# Patient Record
Sex: MALE | URBAN - NONMETROPOLITAN AREA
[De-identification: names, ages, dates, MRNs, and addresses within clinical notes are randomized per-mention and may not be internally consistent; named-entity substitution may affect disease eponyms.]

---

## 2017-12-22 ENCOUNTER — DOCTOR'S OFFICE (OUTPATIENT)
Dept: URBAN - NONMETROPOLITAN AREA CLINIC 1 | Facility: CLINIC | Age: 77
Setting detail: OPHTHALMOLOGY
End: 2017-12-22
Payer: COMMERCIAL

## 2017-12-22 DIAGNOSIS — H18.51: ICD-10-CM

## 2017-12-22 DIAGNOSIS — Z96.1: ICD-10-CM

## 2017-12-22 DIAGNOSIS — H40.003: ICD-10-CM

## 2017-12-22 DIAGNOSIS — H26.493: ICD-10-CM

## 2017-12-22 DIAGNOSIS — H35.363: ICD-10-CM

## 2017-12-22 PROCEDURE — 92250 FUNDUS PHOTOGRAPHY W/I&R: CPT | Performed by: OPHTHALMOLOGY

## 2017-12-22 PROCEDURE — 92134 CPTRZ OPH DX IMG PST SGM RTA: CPT | Performed by: OPHTHALMOLOGY

## 2017-12-22 PROCEDURE — 92014 COMPRE OPH EXAM EST PT 1/>: CPT | Performed by: OPHTHALMOLOGY

## 2017-12-22 ASSESSMENT — REFRACTION_MANIFEST
OU_VA: 20/
OD_VA2: 20/
OD_VA3: 20/
OS_VA2: 20/
OD_VA2: 20/
OD_VA2: 20/
OS_VA2: 20/
OD_VA3: 20/
OS_VA3: 20/
OU_VA: 20/
OD_VA1: 20/
OS_VA2: 20/
OS_VA1: 20/
OD_VA1: 20/
OS_VA3: 20/
OD_VA1: 20/
OS_VA1: 20/
OU_VA: 20/
OS_VA1: 20/
OD_VA3: 20/
OS_VA3: 20/

## 2017-12-22 ASSESSMENT — REFRACTION_AUTOREFRACTION
OD_CYLINDER: -1.00
OS_CYLINDER: -1.00
OD_AXIS: 139
OS_SPHERE: +1.25
OD_SPHERE: +1.50
OS_AXIS: 57

## 2017-12-22 ASSESSMENT — CONFRONTATIONAL VISUAL FIELD TEST (CVF)
OD_FINDINGS: FULL
OS_FINDINGS: FULL

## 2017-12-22 ASSESSMENT — DRY EYES - PHYSICIAN NOTES
OS_GENERALCOMMENTS: MILD K SICCA
OD_GENERALCOMMENTS: MILD K SICCA

## 2017-12-22 ASSESSMENT — VISUAL ACUITY
OS_BCVA: 20/25-2
OD_BCVA: 20/25

## 2017-12-22 ASSESSMENT — REFRACTION_CURRENTRX
OD_OVR_VA: 20/
OS_OVR_VA: 20/
OS_OVR_VA: 20/
OD_OVR_VA: 20/
OD_OVR_VA: 20/
OS_OVR_VA: 20/

## 2017-12-22 ASSESSMENT — SPHEQUIV_DERIVED
OD_SPHEQUIV: 1
OS_SPHEQUIV: 0.75

## 2018-12-19 ENCOUNTER — RX ONLY (RX ONLY)
Age: 78
End: 2018-12-19

## 2018-12-19 ENCOUNTER — DOCTOR'S OFFICE (OUTPATIENT)
Dept: URBAN - NONMETROPOLITAN AREA CLINIC 1 | Facility: CLINIC | Age: 78
Setting detail: OPHTHALMOLOGY
End: 2018-12-19
Payer: COMMERCIAL

## 2018-12-19 DIAGNOSIS — H35.363: ICD-10-CM

## 2018-12-19 DIAGNOSIS — H26.493: ICD-10-CM

## 2018-12-19 DIAGNOSIS — H40.013: ICD-10-CM

## 2018-12-19 DIAGNOSIS — Z96.1: ICD-10-CM

## 2018-12-19 DIAGNOSIS — H18.51: ICD-10-CM

## 2018-12-19 PROCEDURE — 92286 ANT SGM IMG I&R SPECLR MIC: CPT | Performed by: OPHTHALMOLOGY

## 2018-12-19 PROCEDURE — 76514 ECHO EXAM OF EYE THICKNESS: CPT | Performed by: OPHTHALMOLOGY

## 2018-12-19 PROCEDURE — 92134 CPTRZ OPH DX IMG PST SGM RTA: CPT | Performed by: OPHTHALMOLOGY

## 2018-12-19 PROCEDURE — 92083 EXTENDED VISUAL FIELD XM: CPT | Performed by: OPHTHALMOLOGY

## 2018-12-19 PROCEDURE — 92014 COMPRE OPH EXAM EST PT 1/>: CPT | Performed by: OPHTHALMOLOGY

## 2018-12-19 ASSESSMENT — REFRACTION_MANIFEST
OS_VA2: 20/
OD_VA1: 20/
OU_VA: 20/
OS_VA1: 20/
OU_VA: 20/
OD_VA1: 20/
OD_VA2: 20/
OS_VA3: 20/
OD_VA2: 20/
OS_VA3: 20/
OD_VA3: 20/
OD_VA3: 20/
OS_VA1: 20/
OS_VA2: 20/

## 2018-12-19 ASSESSMENT — REFRACTION_CURRENTRX
OD_OVR_VA: 20/
OD_SPHERE: +0.75
OS_OVR_VA: 20/
OS_OVR_VA: 20/
OS_SPHERE: +0.75
OS_VPRISM_DIRECTION: PROGS
OD_ADD: +2.50
OS_AXIS: 126
OD_OVR_VA: 20/
OD_CYLINDER: -1.25
OS_ADD: +2.50
OD_AXIS: 131
OD_OVR_VA: 20/
OS_CYLINDER: -0.75
OS_OVR_VA: 20/
OD_VPRISM_DIRECTION: PROGS

## 2018-12-19 ASSESSMENT — SPHEQUIV_DERIVED
OS_SPHEQUIV: 0.125
OD_SPHEQUIV: 0.75

## 2018-12-19 ASSESSMENT — REFRACTION_AUTOREFRACTION
OD_SPHERE: +1.25
OD_AXIS: 117
OS_SPHERE: +0.50
OD_CYLINDER: -1.00
OS_CYLINDER: -0.75
OS_AXIS: 064

## 2018-12-19 ASSESSMENT — DRY EYES - PHYSICIAN NOTES
OD_GENERALCOMMENTS: MILD K SICCA
OS_GENERALCOMMENTS: MILD K SICCA

## 2018-12-19 ASSESSMENT — PACHYMETRY
OD_CT_UM: 576
OS_CT_UM: 565
OS_CT_CORRECTION: -1
OD_CT_CORRECTION: -2

## 2018-12-19 ASSESSMENT — VISUAL ACUITY
OD_BCVA: 20/25-1
OS_BCVA: 20/25

## 2018-12-19 ASSESSMENT — CONFRONTATIONAL VISUAL FIELD TEST (CVF)
OS_FINDINGS: FULL
OD_FINDINGS: FULL

## 2019-06-19 ENCOUNTER — DOCTOR'S OFFICE (OUTPATIENT)
Dept: URBAN - NONMETROPOLITAN AREA CLINIC 1 | Facility: CLINIC | Age: 79
Setting detail: OPHTHALMOLOGY
End: 2019-06-19
Payer: COMMERCIAL

## 2019-06-19 DIAGNOSIS — H18.51: ICD-10-CM

## 2019-06-19 DIAGNOSIS — H26.493: ICD-10-CM

## 2019-06-19 DIAGNOSIS — H40.013: ICD-10-CM

## 2019-06-19 DIAGNOSIS — Z96.1: ICD-10-CM

## 2019-06-19 DIAGNOSIS — H35.363: ICD-10-CM

## 2019-06-19 PROCEDURE — 92014 COMPRE OPH EXAM EST PT 1/>: CPT | Performed by: OPHTHALMOLOGY

## 2019-06-19 PROCEDURE — 92286 ANT SGM IMG I&R SPECLR MIC: CPT | Performed by: OPHTHALMOLOGY

## 2019-06-19 PROCEDURE — 92083 EXTENDED VISUAL FIELD XM: CPT | Performed by: OPHTHALMOLOGY

## 2019-06-19 ASSESSMENT — REFRACTION_MANIFEST
OD_VA1: 20/
OS_VA2: 20/
OD_VA2: 20/
OD_VA3: 20/
OD_VA3: 20/
OS_VA3: 20/
OS_VA2: 20/
OD_VA2: 20/
OD_VA1: 20/
OS_VA1: 20/
OU_VA: 20/
OS_VA1: 20/
OS_VA3: 20/
OU_VA: 20/

## 2019-06-19 ASSESSMENT — REFRACTION_AUTOREFRACTION
OD_SPHERE: +1.25
OS_CYLINDER: -0.75
OS_AXIS: 064
OD_CYLINDER: -1.00
OS_SPHERE: +0.50
OD_AXIS: 117

## 2019-06-19 ASSESSMENT — DRY EYES - PHYSICIAN NOTES
OD_GENERALCOMMENTS: MILD K SICCA
OS_GENERALCOMMENTS: MILD K SICCA

## 2019-06-19 ASSESSMENT — VISUAL ACUITY
OD_BCVA: 20/25
OS_BCVA: 20/25

## 2019-06-19 ASSESSMENT — REFRACTION_CURRENTRX
OD_OVR_VA: 20/
OS_OVR_VA: 20/
OD_VPRISM_DIRECTION: PROGS
OD_OVR_VA: 20/
OD_SPHERE: +0.75
OS_CYLINDER: -0.75
OS_SPHERE: +0.75
OS_ADD: +2.50
OS_VPRISM_DIRECTION: PROGS
OS_AXIS: 126
OD_AXIS: 131
OD_CYLINDER: -1.25
OD_OVR_VA: 20/
OD_ADD: +2.50

## 2019-06-19 ASSESSMENT — CONFRONTATIONAL VISUAL FIELD TEST (CVF)
OS_FINDINGS: FULL
OD_FINDINGS: FULL

## 2019-06-19 ASSESSMENT — SPHEQUIV_DERIVED
OD_SPHEQUIV: 0.75
OS_SPHEQUIV: 0.125

## 2019-12-18 ENCOUNTER — DOCTOR'S OFFICE (OUTPATIENT)
Dept: URBAN - NONMETROPOLITAN AREA CLINIC 1 | Facility: CLINIC | Age: 79
Setting detail: OPHTHALMOLOGY
End: 2019-12-18
Payer: COMMERCIAL

## 2019-12-18 DIAGNOSIS — H35.363: ICD-10-CM

## 2019-12-18 DIAGNOSIS — H18.51: ICD-10-CM

## 2019-12-18 DIAGNOSIS — H16.222: ICD-10-CM

## 2019-12-18 DIAGNOSIS — H40.013: ICD-10-CM

## 2019-12-18 DIAGNOSIS — H26.493: ICD-10-CM

## 2019-12-18 DIAGNOSIS — H16.221: ICD-10-CM

## 2019-12-18 PROCEDURE — 76514 ECHO EXAM OF EYE THICKNESS: CPT | Performed by: OPHTHALMOLOGY

## 2019-12-18 PROCEDURE — 83861 MICROFLUID ANALY TEARS: CPT | Performed by: OPHTHALMOLOGY

## 2019-12-18 PROCEDURE — 92133 CPTRZD OPH DX IMG PST SGM ON: CPT | Performed by: OPHTHALMOLOGY

## 2019-12-18 PROCEDURE — 92014 COMPRE OPH EXAM EST PT 1/>: CPT | Performed by: OPHTHALMOLOGY

## 2019-12-18 ASSESSMENT — REFRACTION_CURRENTRX
OD_CYLINDER: -1.25
OD_OVR_VA: 20/
OS_OVR_VA: 20/
OD_SPHERE: +0.75
OS_VPRISM_DIRECTION: PROGS
OS_SPHERE: +0.75
OS_OVR_VA: 20/
OD_VPRISM_DIRECTION: PROGS
OD_AXIS: 131
OS_CYLINDER: -0.75
OS_AXIS: 126
OD_ADD: +2.50
OD_OVR_VA: 20/
OS_ADD: +2.50
OD_OVR_VA: 20/
OS_OVR_VA: 20/

## 2019-12-18 ASSESSMENT — REFRACTION_AUTOREFRACTION
OD_AXIS: 117
OD_CYLINDER: -1.00
OS_AXIS: 064
OD_SPHERE: +1.25
OS_CYLINDER: -0.75
OS_SPHERE: +0.50

## 2019-12-18 ASSESSMENT — DRY EYES - PHYSICIAN NOTES
OD_GENERALCOMMENTS: MILD K SICCA
OS_GENERALCOMMENTS: MILD K SICCA

## 2019-12-18 ASSESSMENT — REFRACTION_MANIFEST
OS_VA2: 20/
OD_VA2: 20/
OS_VA3: 20/
OS_VA1: 20/
OD_VA1: 20/
OD_VA3: 20/
OU_VA: 20/
OU_VA: 20/
OD_VA2: 20/
OS_VA2: 20/
OS_VA1: 20/
OS_VA3: 20/
OD_VA1: 20/
OD_VA3: 20/

## 2019-12-18 ASSESSMENT — VISUAL ACUITY
OS_BCVA: 20/20-2
OD_BCVA: 20/20-2

## 2019-12-18 ASSESSMENT — PACHYMETRY
OS_CT_UM: 554
OD_CT_UM: 561
OD_CT_CORRECTION: -1
OS_CT_CORRECTION: -1

## 2019-12-18 ASSESSMENT — CORNEAL DYSTROPHY - POSTERIOR
OS_POSTERIORDYSTROPHY: GUTTATA
OD_POSTERIORDYSTROPHY: GUTTATA

## 2019-12-18 ASSESSMENT — CONFRONTATIONAL VISUAL FIELD TEST (CVF)
OS_FINDINGS: FULL
OD_FINDINGS: FULL

## 2019-12-18 ASSESSMENT — SPHEQUIV_DERIVED
OD_SPHEQUIV: 0.75
OS_SPHEQUIV: 0.125

## 2020-12-22 ENCOUNTER — DOCTOR'S OFFICE (OUTPATIENT)
Dept: URBAN - NONMETROPOLITAN AREA CLINIC 1 | Facility: CLINIC | Age: 80
Setting detail: OPHTHALMOLOGY
End: 2020-12-22
Payer: COMMERCIAL

## 2020-12-22 VITALS — HEIGHT: 60 IN

## 2020-12-22 DIAGNOSIS — H16.221: ICD-10-CM

## 2020-12-22 DIAGNOSIS — H40.013: ICD-10-CM

## 2020-12-22 DIAGNOSIS — H26.493: ICD-10-CM

## 2020-12-22 DIAGNOSIS — H16.222: ICD-10-CM

## 2020-12-22 DIAGNOSIS — H35.363: ICD-10-CM

## 2020-12-22 DIAGNOSIS — Z96.1: ICD-10-CM

## 2020-12-22 DIAGNOSIS — H18.40: ICD-10-CM

## 2020-12-22 PROCEDURE — 92083 EXTENDED VISUAL FIELD XM: CPT | Performed by: OPHTHALMOLOGY

## 2020-12-22 PROCEDURE — 83861 MICROFLUID ANALY TEARS: CPT | Performed by: OPHTHALMOLOGY

## 2020-12-22 PROCEDURE — 92133 CPTRZD OPH DX IMG PST SGM ON: CPT | Performed by: OPHTHALMOLOGY

## 2020-12-22 PROCEDURE — 92286 ANT SGM IMG I&R SPECLR MIC: CPT | Performed by: OPHTHALMOLOGY

## 2020-12-22 PROCEDURE — 92014 COMPRE OPH EXAM EST PT 1/>: CPT | Performed by: OPHTHALMOLOGY

## 2020-12-22 PROCEDURE — 76514 ECHO EXAM OF EYE THICKNESS: CPT | Performed by: OPHTHALMOLOGY

## 2020-12-22 ASSESSMENT — TONOMETRY
OD_IOP_MMHG: 12
OS_IOP_MMHG: 11

## 2020-12-22 ASSESSMENT — CORNEAL DYSTROPHY - POSTERIOR
OD_POSTERIORDYSTROPHY: GUTTATA
OS_POSTERIORDYSTROPHY: GUTTATA

## 2020-12-22 ASSESSMENT — PACHYMETRY
OS_CT_CORRECTION: 1
OD_CT_CORRECTION: 0
OD_CT_UM: 549
OS_CT_UM: 539

## 2020-12-22 ASSESSMENT — REFRACTION_AUTOREFRACTION
OD_SPHERE: +1.25
OS_AXIS: 064
OS_CYLINDER: -0.75
OD_CYLINDER: -1.00
OD_AXIS: 117
OS_SPHERE: +0.50

## 2020-12-22 ASSESSMENT — DRY EYES - PHYSICIAN NOTES
OS_GENERALCOMMENTS: MILD K SICCA
OD_GENERALCOMMENTS: MILD K SICCA

## 2020-12-22 ASSESSMENT — SPHEQUIV_DERIVED
OD_SPHEQUIV: 0.75
OS_SPHEQUIV: 0.125

## 2020-12-22 ASSESSMENT — REFRACTION_CURRENTRX
OS_OVR_VA: 20/
OS_AXIS: 126
OD_VPRISM_DIRECTION: PROGS
OD_CYLINDER: -1.25
OS_SPHERE: +0.75
OD_OVR_VA: 20/
OS_ADD: +2.50
OD_SPHERE: +0.75
OD_AXIS: 131
OS_VPRISM_DIRECTION: PROGS
OS_CYLINDER: -0.75
OD_ADD: +2.50

## 2020-12-22 ASSESSMENT — CONFRONTATIONAL VISUAL FIELD TEST (CVF)
OD_FINDINGS: FULL
OS_FINDINGS: FULL

## 2020-12-22 ASSESSMENT — VISUAL ACUITY
OS_BCVA: 20/25-2
OD_BCVA: 20/25-2

## 2022-05-03 ENCOUNTER — DOCTOR'S OFFICE (OUTPATIENT)
Dept: URBAN - NONMETROPOLITAN AREA CLINIC 1 | Facility: CLINIC | Age: 82
Setting detail: OPHTHALMOLOGY
End: 2022-05-03
Payer: COMMERCIAL

## 2022-05-03 DIAGNOSIS — Z96.1: ICD-10-CM

## 2022-05-03 DIAGNOSIS — H16.221: ICD-10-CM

## 2022-05-03 DIAGNOSIS — H26.493: ICD-10-CM

## 2022-05-03 DIAGNOSIS — H35.363: ICD-10-CM

## 2022-05-03 DIAGNOSIS — H16.222: ICD-10-CM

## 2022-05-03 DIAGNOSIS — H18.40: ICD-10-CM

## 2022-05-03 DIAGNOSIS — H40.013: ICD-10-CM

## 2022-05-03 PROCEDURE — 92083 EXTENDED VISUAL FIELD XM: CPT | Performed by: OPHTHALMOLOGY

## 2022-05-03 PROCEDURE — 76514 ECHO EXAM OF EYE THICKNESS: CPT | Performed by: OPHTHALMOLOGY

## 2022-05-03 PROCEDURE — 83861 MICROFLUID ANALY TEARS: CPT | Performed by: OPHTHALMOLOGY

## 2022-05-03 PROCEDURE — 92133 CPTRZD OPH DX IMG PST SGM ON: CPT | Performed by: OPHTHALMOLOGY

## 2022-05-03 PROCEDURE — 92286 ANT SGM IMG I&R SPECLR MIC: CPT | Performed by: OPHTHALMOLOGY

## 2022-05-03 PROCEDURE — 99214 OFFICE O/P EST MOD 30 MIN: CPT | Performed by: OPHTHALMOLOGY

## 2022-05-03 ASSESSMENT — PACHYMETRY
OS_CT_CORRECTION: -1
OS_CT_UM: 554
OD_CT_CORRECTION: -1
OD_CT_UM: 561

## 2022-05-03 ASSESSMENT — VISUAL ACUITY
OD_BCVA: 20/25-1
OS_BCVA: 20/25-1

## 2022-05-03 ASSESSMENT — CONFRONTATIONAL VISUAL FIELD TEST (CVF)
OS_FINDINGS: FULL
OD_FINDINGS: FULL

## 2022-05-03 ASSESSMENT — REFRACTION_CURRENTRX
OD_CYLINDER: -1.25
OD_VPRISM_DIRECTION: PROGS
OS_AXIS: 126
OD_OVR_VA: 20/
OS_CYLINDER: -0.75
OS_ADD: +2.50
OS_VPRISM_DIRECTION: PROGS
OD_AXIS: 131
OD_SPHERE: +0.75
OS_OVR_VA: 20/
OS_SPHERE: +0.75
OD_ADD: +2.50

## 2022-05-03 ASSESSMENT — SPHEQUIV_DERIVED
OS_SPHEQUIV: 0.125
OD_SPHEQUIV: 0.75

## 2022-05-03 ASSESSMENT — TONOMETRY
OS_IOP_MMHG: 14
OD_IOP_MMHG: 14

## 2022-05-03 ASSESSMENT — REFRACTION_AUTOREFRACTION
OS_CYLINDER: -0.75
OS_SPHERE: +0.50
OS_AXIS: 064
OD_SPHERE: +1.25
OD_CYLINDER: -1.00
OD_AXIS: 117

## 2022-05-03 ASSESSMENT — DRY EYES - PHYSICIAN NOTES
OD_GENERALCOMMENTS: MILD K SICCA
OS_GENERALCOMMENTS: MILD K SICCA

## 2022-05-03 ASSESSMENT — CORNEAL DYSTROPHY - POSTERIOR
OS_POSTERIORDYSTROPHY: GUTTATA
OD_POSTERIORDYSTROPHY: GUTTATA

## 2023-05-30 ENCOUNTER — DOCTOR'S OFFICE (OUTPATIENT)
Dept: URBAN - NONMETROPOLITAN AREA CLINIC 1 | Facility: CLINIC | Age: 83
Setting detail: OPHTHALMOLOGY
End: 2023-05-30
Payer: COMMERCIAL

## 2023-05-30 DIAGNOSIS — Z96.1: ICD-10-CM

## 2023-05-30 DIAGNOSIS — H16.221: ICD-10-CM

## 2023-05-30 DIAGNOSIS — H40.013: ICD-10-CM

## 2023-05-30 DIAGNOSIS — H16.222: ICD-10-CM

## 2023-05-30 DIAGNOSIS — H26.493: ICD-10-CM

## 2023-05-30 DIAGNOSIS — H35.363: ICD-10-CM

## 2023-05-30 PROCEDURE — 92083 EXTENDED VISUAL FIELD XM: CPT | Performed by: OPHTHALMOLOGY

## 2023-05-30 PROCEDURE — 76514 ECHO EXAM OF EYE THICKNESS: CPT | Performed by: OPHTHALMOLOGY

## 2023-05-30 PROCEDURE — 99214 OFFICE O/P EST MOD 30 MIN: CPT | Performed by: OPHTHALMOLOGY

## 2023-05-30 PROCEDURE — 92250 FUNDUS PHOTOGRAPHY W/I&R: CPT | Performed by: OPHTHALMOLOGY

## 2023-05-30 ASSESSMENT — PACHYMETRY
OS_CT_UM: 554
OS_CT_CORRECTION: -1
OD_CT_UM: 561
OD_CT_CORRECTION: -1

## 2023-05-30 ASSESSMENT — REFRACTION_AUTOREFRACTION
OD_SPHERE: +1.25
OS_AXIS: 064
OD_AXIS: 117
OD_CYLINDER: -1.00
OS_SPHERE: +0.50
OS_CYLINDER: -0.75

## 2023-05-30 ASSESSMENT — TONOMETRY
OS_IOP_MMHG: 16
OD_IOP_MMHG: 16

## 2023-05-30 ASSESSMENT — REFRACTION_CURRENTRX
OS_OVR_VA: 20/
OS_ADD: +2.50
OD_CYLINDER: -1.25
OD_ADD: +2.50
OD_VPRISM_DIRECTION: PROGS
OS_SPHERE: +0.75
OD_SPHERE: +0.75
OS_VPRISM_DIRECTION: PROGS
OD_OVR_VA: 20/
OS_CYLINDER: -0.75
OS_AXIS: 126
OD_AXIS: 131

## 2023-05-30 ASSESSMENT — VISUAL ACUITY
OS_BCVA: 20/25-2
OD_BCVA: 20/25-1

## 2023-05-30 ASSESSMENT — SPHEQUIV_DERIVED
OD_SPHEQUIV: 0.75
OS_SPHEQUIV: 0.125

## 2023-05-30 ASSESSMENT — DRY EYES - PHYSICIAN NOTES
OS_GENERALCOMMENTS: MILD K SICCA
OD_GENERALCOMMENTS: MILD K SICCA

## 2023-05-30 ASSESSMENT — CORNEAL DYSTROPHY - POSTERIOR
OD_POSTERIORDYSTROPHY: GUTTATA
OS_POSTERIORDYSTROPHY: GUTTATA

## 2024-06-04 ENCOUNTER — DOCTOR'S OFFICE (OUTPATIENT)
Dept: URBAN - NONMETROPOLITAN AREA CLINIC 1 | Facility: CLINIC | Age: 84
Setting detail: OPHTHALMOLOGY
End: 2024-06-04
Payer: MEDICARE

## 2024-06-04 DIAGNOSIS — H40.013: ICD-10-CM

## 2024-06-04 DIAGNOSIS — H26.493: ICD-10-CM

## 2024-06-04 DIAGNOSIS — H16.221: ICD-10-CM

## 2024-06-04 DIAGNOSIS — H16.222: ICD-10-CM

## 2024-06-04 PROBLEM — H35.40 PERIPAPILLARY ATROPHY: Status: ACTIVE | Noted: 2024-06-04

## 2024-06-04 PROCEDURE — 92014 COMPRE OPH EXAM EST PT 1/>: CPT | Performed by: OPHTHALMOLOGY

## 2024-06-04 PROCEDURE — 92133 CPTRZD OPH DX IMG PST SGM ON: CPT | Performed by: OPHTHALMOLOGY

## 2024-06-04 ASSESSMENT — CONFRONTATIONAL VISUAL FIELD TEST (CVF)
OD_FINDINGS: FULL
OS_FINDINGS: FULL

## 2025-07-01 ENCOUNTER — APPOINTMENT (EMERGENCY)
Dept: CT IMAGING | Facility: HOSPITAL | Age: 85
End: 2025-07-01
Payer: MEDICARE

## 2025-07-01 ENCOUNTER — APPOINTMENT (EMERGENCY)
Dept: RADIOLOGY | Facility: HOSPITAL | Age: 85
End: 2025-07-01
Payer: MEDICARE

## 2025-07-01 ENCOUNTER — HOSPITAL ENCOUNTER (OUTPATIENT)
Facility: HOSPITAL | Age: 85
Setting detail: OBSERVATION
Discharge: HOME | End: 2025-07-02
Attending: EMERGENCY MEDICINE | Admitting: STUDENT IN AN ORGANIZED HEALTH CARE EDUCATION/TRAINING PROGRAM
Payer: MEDICARE

## 2025-07-01 DIAGNOSIS — Q21.20 MASS ASSOCIATED WITH ATRIOVENTRICULAR VALVE LEAFLET IN ATRIOVENTRICULAR SEPTAL DEFECT: ICD-10-CM

## 2025-07-01 DIAGNOSIS — I51.89 ATRIAL MASS: ICD-10-CM

## 2025-07-01 DIAGNOSIS — K59.00 CONSTIPATION: ICD-10-CM

## 2025-07-01 DIAGNOSIS — R60.0 BILATERAL LOWER EXTREMITY EDEMA: ICD-10-CM

## 2025-07-01 DIAGNOSIS — R60.0 BILATERAL LEG EDEMA: Primary | ICD-10-CM

## 2025-07-01 DIAGNOSIS — I50.9 CHF (CONGESTIVE HEART FAILURE) (HCC): ICD-10-CM

## 2025-07-01 DIAGNOSIS — I38 MASS ASSOCIATED WITH ATRIOVENTRICULAR VALVE LEAFLET IN ATRIOVENTRICULAR SEPTAL DEFECT: ICD-10-CM

## 2025-07-01 LAB
2HR DELTA HS TROPONIN: 1 NG/L
ALBUMIN SERPL BCG-MCNC: 4.1 G/DL (ref 3.5–5)
ALP SERPL-CCNC: 77 U/L (ref 34–104)
ALT SERPL W P-5'-P-CCNC: 24 U/L (ref 7–52)
ANION GAP SERPL CALCULATED.3IONS-SCNC: 6 MMOL/L (ref 4–13)
APTT PPP: 52 SECONDS (ref 23–34)
AST SERPL W P-5'-P-CCNC: 17 U/L (ref 13–39)
BASOPHILS # BLD AUTO: 0.05 THOUSANDS/ÂΜL (ref 0–0.1)
BASOPHILS NFR BLD AUTO: 1 % (ref 0–1)
BILIRUB SERPL-MCNC: 0.69 MG/DL (ref 0.2–1)
BNP SERPL-MCNC: 525 PG/ML (ref 0–100)
BUN SERPL-MCNC: 23 MG/DL (ref 5–25)
CALCIUM SERPL-MCNC: 9.3 MG/DL (ref 8.4–10.2)
CARDIAC TROPONIN I PNL SERPL HS: 7 NG/L (ref ?–50)
CARDIAC TROPONIN I PNL SERPL HS: 8 NG/L (ref ?–50)
CHLORIDE SERPL-SCNC: 99 MMOL/L (ref 96–108)
CO2 SERPL-SCNC: 31 MMOL/L (ref 21–32)
CREAT SERPL-MCNC: 0.89 MG/DL (ref 0.6–1.3)
D DIMER PPP FEU-MCNC: <0.27 UG/ML FEU
EOSINOPHIL # BLD AUTO: 0.04 THOUSAND/ÂΜL (ref 0–0.61)
EOSINOPHIL NFR BLD AUTO: 1 % (ref 0–6)
ERYTHROCYTE [DISTWIDTH] IN BLOOD BY AUTOMATED COUNT: 13.5 % (ref 11.6–15.1)
GFR SERPL CREATININE-BSD FRML MDRD: 77 ML/MIN/1.73SQ M
GLUCOSE SERPL-MCNC: 102 MG/DL (ref 65–140)
HCT VFR BLD AUTO: 41.8 % (ref 36.5–49.3)
HGB BLD-MCNC: 12.8 G/DL (ref 12–17)
IMM GRANULOCYTES # BLD AUTO: 0.02 THOUSAND/UL (ref 0–0.2)
IMM GRANULOCYTES NFR BLD AUTO: 0 % (ref 0–2)
INR PPP: 1.14 (ref 0.85–1.19)
LYMPHOCYTES # BLD AUTO: 1.19 THOUSANDS/ÂΜL (ref 0.6–4.47)
LYMPHOCYTES NFR BLD AUTO: 17 % (ref 14–44)
MCH RBC QN AUTO: 29.4 PG (ref 26.8–34.3)
MCHC RBC AUTO-ENTMCNC: 30.6 G/DL (ref 31.4–37.4)
MCV RBC AUTO: 96 FL (ref 82–98)
MONOCYTES # BLD AUTO: 0.96 THOUSAND/ÂΜL (ref 0.17–1.22)
MONOCYTES NFR BLD AUTO: 13 % (ref 4–12)
NEUTROPHILS # BLD AUTO: 4.9 THOUSANDS/ÂΜL (ref 1.85–7.62)
NEUTS SEG NFR BLD AUTO: 68 % (ref 43–75)
NRBC BLD AUTO-RTO: 0 /100 WBCS
PLATELET # BLD AUTO: 253 THOUSANDS/UL (ref 149–390)
PMV BLD AUTO: 10.5 FL (ref 8.9–12.7)
POTASSIUM SERPL-SCNC: 4.7 MMOL/L (ref 3.5–5.3)
PROT SERPL-MCNC: 6.9 G/DL (ref 6.4–8.4)
PROTHROMBIN TIME: 15 SECONDS (ref 12.3–15)
RBC # BLD AUTO: 4.35 MILLION/UL (ref 3.88–5.62)
SODIUM SERPL-SCNC: 136 MMOL/L (ref 135–147)
WBC # BLD AUTO: 7.16 THOUSAND/UL (ref 4.31–10.16)

## 2025-07-01 PROCEDURE — 93005 ELECTROCARDIOGRAM TRACING: CPT

## 2025-07-01 PROCEDURE — 96374 THER/PROPH/DIAG INJ IV PUSH: CPT

## 2025-07-01 PROCEDURE — 71275 CT ANGIOGRAPHY CHEST: CPT

## 2025-07-01 PROCEDURE — 83880 ASSAY OF NATRIURETIC PEPTIDE: CPT | Performed by: EMERGENCY MEDICINE

## 2025-07-01 PROCEDURE — 99223 1ST HOSP IP/OBS HIGH 75: CPT | Performed by: NURSE PRACTITIONER

## 2025-07-01 PROCEDURE — 84484 ASSAY OF TROPONIN QUANT: CPT | Performed by: EMERGENCY MEDICINE

## 2025-07-01 PROCEDURE — 99285 EMERGENCY DEPT VISIT HI MDM: CPT | Performed by: EMERGENCY MEDICINE

## 2025-07-01 PROCEDURE — 85730 THROMBOPLASTIN TIME PARTIAL: CPT | Performed by: EMERGENCY MEDICINE

## 2025-07-01 PROCEDURE — 74177 CT ABD & PELVIS W/CONTRAST: CPT

## 2025-07-01 PROCEDURE — 99284 EMERGENCY DEPT VISIT MOD MDM: CPT

## 2025-07-01 PROCEDURE — 80053 COMPREHEN METABOLIC PANEL: CPT | Performed by: EMERGENCY MEDICINE

## 2025-07-01 PROCEDURE — 85025 COMPLETE CBC W/AUTO DIFF WBC: CPT | Performed by: EMERGENCY MEDICINE

## 2025-07-01 PROCEDURE — 71045 X-RAY EXAM CHEST 1 VIEW: CPT

## 2025-07-01 PROCEDURE — 85610 PROTHROMBIN TIME: CPT | Performed by: EMERGENCY MEDICINE

## 2025-07-01 PROCEDURE — 85379 FIBRIN DEGRADATION QUANT: CPT | Performed by: EMERGENCY MEDICINE

## 2025-07-01 PROCEDURE — 36415 COLL VENOUS BLD VENIPUNCTURE: CPT | Performed by: EMERGENCY MEDICINE

## 2025-07-01 RX ORDER — SIMVASTATIN 40 MG
40 TABLET ORAL
COMMUNITY

## 2025-07-01 RX ORDER — ATENOLOL 50 MG/1
100 TABLET ORAL 2 TIMES DAILY
COMMUNITY

## 2025-07-01 RX ORDER — FUROSEMIDE 10 MG/ML
40 INJECTION INTRAMUSCULAR; INTRAVENOUS ONCE
Status: COMPLETED | OUTPATIENT
Start: 2025-07-01 | End: 2025-07-01

## 2025-07-01 RX ORDER — ENOXAPARIN SODIUM 100 MG/ML
40 INJECTION SUBCUTANEOUS DAILY
Status: DISCONTINUED | OUTPATIENT
Start: 2025-07-02 | End: 2025-07-02 | Stop reason: HOSPADM

## 2025-07-01 RX ORDER — ACETAMINOPHEN 325 MG/1
650 TABLET ORAL EVERY 6 HOURS PRN
Status: DISCONTINUED | OUTPATIENT
Start: 2025-07-01 | End: 2025-07-02 | Stop reason: HOSPADM

## 2025-07-01 RX ORDER — PRAVASTATIN SODIUM 80 MG/1
80 TABLET ORAL
Status: DISCONTINUED | OUTPATIENT
Start: 2025-07-01 | End: 2025-07-02 | Stop reason: HOSPADM

## 2025-07-01 RX ORDER — LISINOPRIL 20 MG/1
20 TABLET ORAL DAILY
Status: DISCONTINUED | OUTPATIENT
Start: 2025-07-02 | End: 2025-07-02 | Stop reason: HOSPADM

## 2025-07-01 RX ORDER — ASPIRIN 81 MG/1
81 TABLET, CHEWABLE ORAL DAILY
COMMUNITY

## 2025-07-01 RX ORDER — DOCUSATE SODIUM 100 MG/1
100 CAPSULE, LIQUID FILLED ORAL DAILY
Status: DISCONTINUED | OUTPATIENT
Start: 2025-07-02 | End: 2025-07-02 | Stop reason: HOSPADM

## 2025-07-01 RX ORDER — ASPIRIN 81 MG/1
81 TABLET, CHEWABLE ORAL DAILY
Status: DISCONTINUED | OUTPATIENT
Start: 2025-07-02 | End: 2025-07-02 | Stop reason: HOSPADM

## 2025-07-01 RX ORDER — LISINOPRIL 20 MG/1
20 TABLET ORAL DAILY
COMMUNITY

## 2025-07-01 RX ORDER — MULTIVITAMIN
1 TABLET ORAL DAILY
COMMUNITY

## 2025-07-01 RX ORDER — ATENOLOL 25 MG/1
100 TABLET ORAL 2 TIMES DAILY
Status: DISCONTINUED | OUTPATIENT
Start: 2025-07-01 | End: 2025-07-02 | Stop reason: HOSPADM

## 2025-07-01 RX ADMIN — FUROSEMIDE 40 MG: 10 INJECTION, SOLUTION INTRAMUSCULAR; INTRAVENOUS at 17:24

## 2025-07-01 RX ADMIN — PRAVASTATIN SODIUM 80 MG: 80 TABLET ORAL at 21:32

## 2025-07-01 RX ADMIN — ATENOLOL 100 MG: 25 TABLET ORAL at 21:32

## 2025-07-01 RX ADMIN — IOHEXOL 75 ML: 350 INJECTION, SOLUTION INTRAVENOUS at 18:11

## 2025-07-01 NOTE — ED PROVIDER NOTES
Time reflects when diagnosis was documented in both MDM as applicable and the Disposition within this note       Time User Action Codes Description Comment    7/1/2025  8:00 PM Bean Guerrero Add [R60.0] Bilateral leg edema           ED Disposition       ED Disposition   Admit    Condition   Stable    Date/Time   Tue Jul 1, 2025  8:00 PM    Comment   Case was discussed with hospital medicine and the patient's admission status was agreed to be Admission Status: observation status to the service of Dr. Levin.               Assessment & Plan       Medical Decision Making  84-year-old male presents to the emergency department with bilateral lower extremity pitting edema differential diagnosis include lymphedema, DVT, CHF, CKD, venous insufficiency, nephrotic syndrome, will perform CT PE study as we do not have ultrasound in-house at this time.  Will also evaluate patient's kidney function, BNP, and reassess.    Amount and/or Complexity of Data Reviewed  Labs: ordered.  Radiology: ordered.    Risk  Prescription drug management.  Decision regarding hospitalization.        ED Course as of 07/01/25 2001 Tue Jul 01, 2025 2001 Discussed case with hospital medicine, patient will be admitted for further evaluation, cardiology consult, and WILVER       Medications   furosemide (LASIX) injection 40 mg (40 mg Intravenous Given 7/1/25 1724)   iohexol (OMNIPAQUE) 350 MG/ML injection (MULTI-DOSE) 75 mL (75 mL Intravenous Given 7/1/25 1811)       ED Risk Strat Scores                    No data recorded        SBIRT 22yo+      Flowsheet Row Most Recent Value   Initial Alcohol Screen: US AUDIT-C     1. How often do you have a drink containing alcohol? 0 Filed at: 07/01/2025 1713   2. How many drinks containing alcohol do you have on a typical day you are drinking?  0 Filed at: 07/01/2025 1713   3b. FEMALE Any Age, or MALE 65+: How often do you have 4 or more drinks on one occassion? 0 Filed at: 07/01/2025 1713   Audit-C Score 0 Filed  at: 07/01/2025 1713   CAMERON: How many times in the past year have you...    Used an illegal drug or used a prescription medication for non-medical reasons? Never Filed at: 07/01/2025 1713                            History of Present Illness       Chief Complaint   Patient presents with    Leg Swelling     Patient presents to the ED with complaints of bilateral leg swelling. The patient reports recent travel to [place]. He denies history of CHF and kidney disease. No SOB or chest pain noted.        Past Medical History[1]   Past Surgical History[2]   Family History[3]   Social History[4]   E-Cigarette/Vaping    E-Cigarette Use Never User       E-Cigarette/Vaping Substances      I have reviewed and agree with the history as documented.     84-year-old male with past medical history of MI in the past presents to the emergency department with complaint of bilateral lower extremity swelling/pitting edema going on for the past couple days.  Patient recently came back from [place], he states that the swelling started gradually, and worsened.  He denies any chills, fevers, coughing, mucus, chest pain, shortness of breath, GI or  complaints.  Patient does not take any diuretics.  He has no history of congestive heart failure, CKD, AMIRA's.  Patient does not take any blood thinners, he denies any history of blood clots in the past.          Review of Systems   Constitutional:  Negative for chills and fever.   HENT:  Negative for ear pain and sore throat.    Eyes:  Negative for pain and visual disturbance.   Respiratory:  Negative for cough and shortness of breath.    Cardiovascular:  Positive for leg swelling. Negative for chest pain and palpitations.   Gastrointestinal:  Negative for abdominal pain and vomiting.   Genitourinary:  Negative for dysuria, hematuria and penile discharge.   Musculoskeletal:  Negative for arthralgias and back pain.   Skin:  Negative for color change and rash.   Neurological:  Negative for seizures  and syncope.   All other systems reviewed and are negative.          Objective       ED Triage Vitals [07/01/25 1711]   Temperature Pulse Blood Pressure Respirations SpO2 Patient Position - Orthostatic VS   97.6 °F (36.4 °C) 67 138/83 16 93 % Sitting      Temp Source Heart Rate Source BP Location FiO2 (%) Pain Score    Temporal Monitor Right arm -- No Pain      Vitals      Date and Time Temp Pulse SpO2 Resp BP Pain Score FACES Pain Rating User   07/01/25 1900 -- 56 94 % 18 147/65 -- -- AD   07/01/25 1830 -- 64 95 % 18 152/74 -- -- SB   07/01/25 1800 -- 55 95 % 18 168/88 -- -- SB   07/01/25 1711 97.6 °F (36.4 °C) 67 93 % 16 138/83 No Pain -- RR            Physical Exam  Vitals and nursing note reviewed.   Constitutional:       General: He is not in acute distress.     Appearance: He is well-developed.   HENT:      Head: Normocephalic and atraumatic.     Eyes:      Conjunctiva/sclera: Conjunctivae normal.       Cardiovascular:      Rate and Rhythm: Normal rate and regular rhythm.   Pulmonary:      Effort: Pulmonary effort is normal. No respiratory distress.      Breath sounds: Normal breath sounds.   Abdominal:      Palpations: Abdomen is soft.      Tenderness: There is no abdominal tenderness.     Musculoskeletal:         General: Swelling present.      Cervical back: Neck supple.      Right lower leg: Edema present.      Left lower leg: Edema present.     Skin:     General: Skin is warm and dry.      Capillary Refill: Capillary refill takes less than 2 seconds.     Neurological:      Mental Status: He is alert.     Psychiatric:         Mood and Affect: Mood normal.         Results Reviewed       Procedure Component Value Units Date/Time    HS Troponin I 2hr [[id number]] Collected: 07/01/25 1939    Lab Status: In process Specimen: Blood from Arm, Right Updated: 07/01/25 1942    HS Troponin I 4hr [[id number]]     Lab Status: No result Specimen: Blood     B-Type Natriuretic Peptide(BNP) [[id number]]  (Abnormal) Collected:  07/01/25 1725    Lab Status: Final result Specimen: Blood from Arm, Right Updated: 07/01/25 1806      pg/mL     HS Troponin 0hr (reflex protocol) [[id number]]  (Normal) Collected: 07/01/25 1725    Lab Status: Final result Specimen: Blood from Arm, Right Updated: 07/01/25 1759     hs TnI 0hr 7 ng/L     Comprehensive metabolic panel [[id number]] Collected: 07/01/25 1725    Lab Status: Final result Specimen: Blood from Arm, Right Updated: 07/01/25 1755     Sodium 136 mmol/L      Potassium 4.7 mmol/L      Chloride 99 mmol/L      CO2 31 mmol/L      ANION GAP 6 mmol/L      BUN 23 mg/dL      Creatinine 0.89 mg/dL      Glucose 102 mg/dL      Calcium 9.3 mg/dL      AST 17 U/L      ALT 24 U/L      Alkaline Phosphatase 77 U/L      Total Protein 6.9 g/dL      Albumin 4.1 g/dL      Total Bilirubin 0.69 mg/dL      eGFR 77 ml/min/1.73sq m     Narrative:      National Kidney Disease Foundation guidelines for Chronic Kidney Disease (CKD):     Stage 1 with normal or high GFR (GFR > 90 mL/min/1.73 square meters)    Stage 2 Mild CKD (GFR = 60-89 mL/min/1.73 square meters)    Stage 3A Moderate CKD (GFR = 45-59 mL/min/1.73 square meters)    Stage 3B Moderate CKD (GFR = 30-44 mL/min/1.73 square meters)    Stage 4 Severe CKD (GFR = 15-29 mL/min/1.73 square meters)    Stage 5 End Stage CKD (GFR <15 mL/min/1.73 square meters)  Note: GFR calculation is accurate only with a steady state creatinine    D-dimer, quantitative [[id number]]  (Normal) Collected: 07/01/25 1725    Lab Status: Final result Specimen: Blood from Arm, Right Updated: 07/01/25 1753     D-Dimer, Quant <0.27 ug/ml FEU     Narrative:      In the evaluation for possible pulmonary embolism, in the appropriate (Well's Score of 4 or less) patient, the age adjusted d-dimer cutoff for this patient can be calculated as:    Age x 0.01 (in ug/mL) for Age-adjusted D-dimer exclusion threshold for a patient over 50 years.    APTT [[id number]]  (Abnormal) Collected: 07/01/25 6375    Lab  Status: Final result Specimen: Blood from Arm, Right Updated: 07/01/25 1750     PTT 52 seconds     Protime-INR [[id number]]  (Normal) Collected: 07/01/25 1725    Lab Status: Final result Specimen: Blood from Arm, Right Updated: 07/01/25 1750     Protime 15.0 seconds      INR 1.14    Narrative:      INR Therapeutic Range    Indication                                             INR Range      Atrial Fibrillation                                               2.0-3.0  Hypercoagulable State                                    2.0.2.3  Left Ventricular Asist Device                            2.0-3.0  Mechanical Heart Valve                                  -    Aortic(with afib, MI, embolism, HF, LA enlargement,    and/or coagulopathy)                                     2.0-3.0 (2.5-3.5)     Mitral                                                             2.5-3.5  Prosthetic/Bioprosthetic Heart Valve               2.0-3.0  Venous thromboembolism (VTE: VT, PE        2.0-3.0    CBC and differential [[id number]]  (Abnormal) Collected: 07/01/25 1725    Lab Status: Final result Specimen: Blood from Arm, Right Updated: 07/01/25 1731     WBC 7.16 Thousand/uL      RBC 4.35 Million/uL      Hemoglobin 12.8 g/dL      Hematocrit 41.8 %      MCV 96 fL      MCH 29.4 pg      MCHC 30.6 g/dL      RDW 13.5 %      MPV 10.5 fL      Platelets 253 Thousands/uL      nRBC 0 /100 WBCs      Segmented % 68 %      Immature Grans % 0 %      Lymphocytes % 17 %      Monocytes % 13 %      Eosinophils Relative 1 %      Basophils Relative 1 %      Absolute Neutrophils 4.90 Thousands/µL      Absolute Immature Grans 0.02 Thousand/uL      Absolute Lymphocytes 1.19 Thousands/µL      Absolute Monocytes 0.96 Thousand/µL      Eosinophils Absolute 0.04 Thousand/µL      Basophils Absolute 0.05 Thousands/µL             CT pe study w abdomen pelvis w contrast   Final Interpretation by Rene Dickens MD (07/01 1939)         1. Low-attenuation 5 x 4.5 x 4 cm  mass in the left atrium could represent thrombus or atrial myxoma. Recommend cardiology consultation and WILVER.   2. No evidence of acute pulmonary embolus or thoracic aortic aneurysm.   3. No acute pulmonary process.   4. No acute intra-abdominal or pelvic process.                  Computerized Assisted Algorithm (CAA) may have aided analysis of applicable images.      Workstation performed: [id number]         X-ray chest 1 view portable    (Results Pending)       Procedures    ED Medication and Procedure Management   None     Patient's Medications    No medications on file     No discharge procedures on file.  ED SEPSIS DOCUMENTATION   Time reflects when diagnosis was documented in both MDM as applicable and the Disposition within this note       Time User Action Codes Description Comment    7/1/2025  8:00 PM Bean Guerrero Add [R60.0] Bilateral leg edema                    [1] No past medical history on file.  [2] No past surgical history on file.  [3] No family history on file.  [4]   Social History  Tobacco Use    Smoking status: Never    Smokeless tobacco: Never   Vaping Use    Vaping status: Never Used   Substance Use Topics    Alcohol use: Not Currently    Drug use: Never        Bean Guerrero DO  07/01/25 2001

## 2025-07-02 ENCOUNTER — APPOINTMENT (OUTPATIENT)
Dept: NON INVASIVE DIAGNOSTICS | Facility: HOSPITAL | Age: 85
End: 2025-07-02
Payer: MEDICARE

## 2025-07-02 VITALS
TEMPERATURE: 98.4 F | SYSTOLIC BLOOD PRESSURE: 141 MMHG | BODY MASS INDEX: 26.07 KG/M2 | HEART RATE: 68 BPM | OXYGEN SATURATION: 94 % | RESPIRATION RATE: 20 BRPM | HEIGHT: 68 IN | DIASTOLIC BLOOD PRESSURE: 84 MMHG | WEIGHT: 172 LBS

## 2025-07-02 PROBLEM — I25.10 CAD (CORONARY ARTERY DISEASE): Status: ACTIVE | Noted: 2025-07-02

## 2025-07-02 PROBLEM — I10 ESSENTIAL HYPERTENSION: Status: ACTIVE | Noted: 2025-07-02

## 2025-07-02 PROBLEM — R60.0 BILATERAL LOWER EXTREMITY EDEMA: Status: ACTIVE | Noted: 2025-07-02

## 2025-07-02 LAB
AORTIC ROOT: 3.4 CM
ASCENDING AORTA: 3.1 CM
ATRIAL RATE: 66 BPM
BSA FOR ECHO PROCEDURE: 1.92 M2
E WAVE DECELERATION TIME: 353 MS
E/A RATIO: 1.42
FRACTIONAL SHORTENING: 39 (ref 28–44)
INTERVENTRICULAR SEPTUM IN DIASTOLE (PARASTERNAL SHORT AXIS VIEW): 1.1 CM
INTERVENTRICULAR SEPTUM: 1.3 CM (ref 0.6–1.1)
LAAS-AP2: 25.2 CM2
LAAS-AP4: 22.3 CM2
LEFT ATRIUM SIZE: 5.2 CM
LEFT ATRIUM VOLUME (MOD BIPLANE): 74 ML
LEFT ATRIUM VOLUME INDEX (MOD BIPLANE): 38.5 ML/M2
LEFT INTERNAL DIMENSION IN SYSTOLE: 2.5 CM (ref 2.1–4)
LEFT VENTRICULAR INTERNAL DIMENSION IN DIASTOLE: 4.1 CM (ref 3.5–6)
LEFT VENTRICULAR POSTERIOR WALL IN END DIASTOLE: 1 CM
LEFT VENTRICULAR STROKE VOLUME: 53 ML
LV EF US.2D.A4C+ESTIMATED: 66 %
LVSV (TEICH): 53 ML
MV E'TISSUE VEL-SEP: 10 CM/S
MV PEAK A VEL: 0.62 M/S
MV PEAK E VEL: 88 CM/S
MV STENOSIS PRESSURE HALF TIME: 102 MS
MV VALVE AREA P 1/2 METHOD: 2.16
P AXIS: 62 DEGREES
PR INTERVAL: 214 MS
QRS AXIS: 73 DEGREES
QRSD INTERVAL: 88 MS
QT INTERVAL: 438 MS
QTC INTERVAL: 459 MS
RIGHT ATRIUM AREA SYSTOLE A4C: 20.7 CM2
RIGHT VENTRICLE ID DIMENSION: 3.5 CM
SL CV LEFT ATRIUM LENGTH A2C: 6 CM
SL CV LV EF: 65
SL CV PED ECHO LEFT VENTRICLE DIASTOLIC VOLUME (MOD BIPLANE) 2D: 75 ML
SL CV PED ECHO LEFT VENTRICLE SYSTOLIC VOLUME (MOD BIPLANE) 2D: 22 ML
T WAVE AXIS: -17 DEGREES
TR MAX PG: 33 MMHG
TR PEAK VELOCITY: 2.9 M/S
TRICUSPID ANNULAR PLANE SYSTOLIC EXCURSION: 2.3 CM
TRICUSPID VALVE PEAK REGURGITATION VELOCITY: 2.87 M/S
VENTRICULAR RATE: 66 BPM

## 2025-07-02 PROCEDURE — 93970 EXTREMITY STUDY: CPT | Performed by: SURGERY

## 2025-07-02 PROCEDURE — 93306 TTE W/DOPPLER COMPLETE: CPT

## 2025-07-02 PROCEDURE — 99239 HOSP IP/OBS DSCHRG MGMT >30: CPT | Performed by: STUDENT IN AN ORGANIZED HEALTH CARE EDUCATION/TRAINING PROGRAM

## 2025-07-02 PROCEDURE — 93010 ELECTROCARDIOGRAM REPORT: CPT | Performed by: INTERNAL MEDICINE

## 2025-07-02 PROCEDURE — 93306 TTE W/DOPPLER COMPLETE: CPT | Performed by: INTERNAL MEDICINE

## 2025-07-02 PROCEDURE — 93970 EXTREMITY STUDY: CPT

## 2025-07-02 PROCEDURE — 99204 OFFICE O/P NEW MOD 45 MIN: CPT | Performed by: INTERNAL MEDICINE

## 2025-07-02 RX ORDER — DOCUSATE SODIUM 100 MG/1
100 CAPSULE, LIQUID FILLED ORAL DAILY
Qty: 30 CAPSULE | Refills: 0 | Status: SHIPPED | OUTPATIENT
Start: 2025-07-03

## 2025-07-02 RX ORDER — FUROSEMIDE 20 MG/1
20 TABLET ORAL DAILY PRN
Qty: 30 TABLET | Refills: 0 | Status: SHIPPED | OUTPATIENT
Start: 2025-07-02

## 2025-07-02 RX ADMIN — ENOXAPARIN SODIUM 40 MG: 40 INJECTION SUBCUTANEOUS at 09:02

## 2025-07-02 RX ADMIN — LISINOPRIL 20 MG: 20 TABLET ORAL at 09:04

## 2025-07-02 RX ADMIN — DOCUSATE SODIUM 100 MG: 100 CAPSULE, LIQUID FILLED ORAL at 09:03

## 2025-07-02 RX ADMIN — ASPIRIN 81 MG: 81 TABLET, CHEWABLE ORAL at 09:03

## 2025-07-02 RX ADMIN — ATENOLOL 100 MG: 25 TABLET ORAL at 09:04

## 2025-07-02 NOTE — ASSESSMENT & PLAN NOTE
Chronic edema worsened in the past 2 days following travel from [place].  Improved with IV furosemide x 1 dose.  BNP elevated (likely secondary to LA mass). No vascular congestion on chest imaging.  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  D-dimer negative. No PE on CTA chest  Venous duplex of the LE pending. I am highly suspicious edema is secondary to venous insufficiency.  May benefit from PRN furosemide 20 mg PO outpatient.

## 2025-07-02 NOTE — CASE MANAGEMENT
Case Management Assessment & Discharge Planning Note    Patient name Ridge Wick  Location /-01 MRN [id number]  : 1940 Date 2025       Current Admission Date: 2025  Current Admission Diagnosis:Atrial mass   Patient Active Problem List    Diagnosis Date Noted    Bilateral lower extremity edema 2025    Essential hypertension 2025    CAD (coronary artery disease) 2025    Atrial mass 2025      LOS (days): 0  Geometric Mean LOS (GMLOS) (days):   Days to GMLOS:     OBJECTIVE:              Current admission status: Observation  Referral Reason: Other (Disposition Planning)    Preferred Pharmacy:   [facility]  [address] [facility] [address]  Phone: [phone] Fax: [phone]    Primary Care Provider: Omari Williamson DO    Primary Insurance: MEDICARE  Secondary Insurance: AARP    ASSESSMENT:  Active Health Care Proxies    There are no active Health Care Proxies on file.       Advance Directives  Does patient have a Health Care POA?: Yes  Does patient have Advance Directives?: Yes  Advance Directives: Living will, Power of  for health care  Primary Contact: Jovanni Wick, jayro         Readmission Root Cause  30 Day Readmission: No    Patient Information  Admitted from:: Home  Mental Status: Alert  During Assessment patient was accompanied by: Son, Daughter  Assessment information provided by:: Patient  Primary Caregiver: Self  Support Systems: Son, Daughter  [place] [facility]  What [facility] do you live in?: [employer] [place]  Home entry access options. Select all that apply.: Stairs  Number of steps to enter home.: 2  Do the steps have railings?: No  Type of Current Residence: [employer]  Living Arrangements: Lives w/ Daughter  Is patient a ?: Yes ([employer])  [facility] with [facility]: No    Activities of Daily Living Prior to Admission  Functional Status: Independent  Completes  ADLs independently?: Yes  Ambulates independently?: Yes  Does patient use assisted devices?: No  Does patient currently own DME?: No  Does patient have a history of Outpatient Therapy (PT/OT)?: Yes (hyperbaric therapy)  Does the patient have a history of Short-Term Rehab?: No  Does patient have a history of HHC?: Yes ([facility] VN for Wound Vac)  Does patient currently have HHC?: No         Patient Information Continued  Income Source: SSI/SSD  Does patient have prescription coverage?: Yes  Can the patient afford their medications and any related supplies (such as glucometers or test strips)?: Yes  Does patient receive dialysis treatments?: No  Does patient have a history of substance abuse?: No  Does patient have a history of Mental Health Diagnosis?: No         Means of Transportation  Means of Transport to Appts:: Drives Self          DISCHARGE DETAILS:    Discharge planning discussed with:: patient, daughter Sahara, and jayro Galvin  Freedom of Choice: Yes     CM contacted family/caregiver?: Yes  Were Treatment Team discharge recommendations reviewed with patient/caregiver?: Yes  Did patient/caregiver verbalize understanding of patient care needs?: Yes       Contacts  Patient Contacts: julien Velarde  Relationship to Patient:: Family  Contact Method: In Person  Reason/Outcome: Continuity of Care, Discharge Planning    Requested Home Health Care         Is the patient interested in HHC at discharge?: No    DME Referral Provided  Referral made for DME?: No    Other Referral/Resources/Interventions Provided:  Interventions: PCP         Treatment Team Recommendation: Home  Expected Discharge Disposition: Home or Self Care  Additional Discharge Dispositions: Home or Self Care  Transport at Discharge : Family                       1000 CM met with patient, daughter Sahara, and jayro Galvin, at the bedside, baseline information was obtained. CM discussed the role of CM in helping the patient develop a discharge plan and  assist the patient in carry out their plan.     Patient lives in ranch home with daughter, Sahara.  Patient independent with mobility.    Patient reports he desires to follow up with Dr Paulson, [facility] cardiology, for after care.    CM to follow for CM discharge needs.      1130 CM called PCP office and left voice message for return call.     1235 CM received [facility] PCP office. CM secured Hospital Discharge Follow Up appt for 7/11/25 at 11:00 AM, information placed on AVS.

## 2025-07-02 NOTE — PLAN OF CARE
Problem: PAIN - ADULT  Goal: Verbalizes/displays adequate comfort level or baseline comfort level  Description: Interventions:  - Encourage patient to monitor pain and request assistance  - Assess pain using appropriate pain scale  - Administer analgesics as ordered based on type and severity of pain and evaluate response  - Implement non-pharmacological measures as appropriate and evaluate response  - Consider cultural and social influences on pain and pain management  - Notify physician/advanced practitioner if interventions unsuccessful or patient reports new pain  - Educate patient/family on pain management process including their role and importance of  reporting pain   - Provide non-pharmacologic/complimentary pain relief interventions  Outcome: Progressing     Problem: INFECTION - ADULT  Goal: Absence or prevention of progression during hospitalization  Description: INTERVENTIONS:  - Assess and monitor for signs and symptoms of infection  - Monitor lab/diagnostic results  - Monitor all insertion sites, i.e. indwelling lines, tubes, and drains  - Monitor endotracheal if appropriate and nasal secretions for changes in amount and color  - [place] appropriate cooling/warming therapies per order  - Administer medications as ordered  - Instruct and encourage patient and family to use good hand hygiene technique  - Identify and instruct in appropriate isolation precautions for identified infection/condition  Outcome: Progressing  Goal: Absence of fever/infection during neutropenic period  Description: INTERVENTIONS:  - Monitor WBC  - Perform strict hand hygiene  - Limit to healthy visitors only  - No plants, dried, fresh or silk flowers with medina in patient room  Outcome: Progressing

## 2025-07-02 NOTE — ASSESSMENT & PLAN NOTE
Presented from home with lower extremity edema after recent [place] vacation, incidental finding on CTA chest  Low-attenuation 5 x 4.5 x 4 cm mass in the left atrium could represent thrombus or atrial myxoma.   Will keep n.p.o. pending cardiology evaluation  Appreciate cardiology consult

## 2025-07-02 NOTE — ASSESSMENT & PLAN NOTE
Presented from home with bilateral lower extremity edema L>R after a [place] vacation.  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  No JVD, no vascular congestion on chest x-ray  Received 1 dose of IV diuretics in ED with notable improvement in bilateral lower extremity edema  BNP elevated likely secondary to LE mass.  No vascular congestion on chest imaging  TTE with preserved LVEF with KD mass consistent with myxoma, mild TR  Will DC patient on as needed furosemide 20 mg p.o. outpatient

## 2025-07-02 NOTE — ASSESSMENT & PLAN NOTE
History of anterior MI s/p BMS to LAD in 1999  He has done very well in the past 26 years.  Remains without anginal complaints. HS trop neg x 2.  Echo today shows preserved EF with no significant wall motion abnormality.   Continue aspirin, statin, and beta blocker.

## 2025-07-02 NOTE — ASSESSMENT & PLAN NOTE
Presented from home with lower extremity edema after recent [place] vacation, incidental finding on CTA chest  Low-attenuation 5 x 4.5 x 4 cm mass in the left atrium could represent thrombus or atrial myxoma.   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

## 2025-07-02 NOTE — PLAN OF CARE
Problem: PAIN - ADULT  Goal: Verbalizes/displays adequate comfort level or baseline comfort level  Description: Interventions:  - Encourage patient to monitor pain and request assistance  - Assess pain using appropriate pain scale  - Administer analgesics as ordered based on type and severity of pain and evaluate response  - Implement non-pharmacological measures as appropriate and evaluate response  - Consider cultural and social influences on pain and pain management  - Notify physician/advanced practitioner if interventions unsuccessful or patient reports new pain  - Educate patient/family on pain management process including their role and importance of  reporting pain   - Provide non-pharmacologic/complimentary pain relief interventions  Outcome: Adequate for Discharge     Problem: INFECTION - ADULT  Goal: Absence or prevention of progression during hospitalization  Description: INTERVENTIONS:  - Assess and monitor for signs and symptoms of infection  - Monitor lab/diagnostic results  - Monitor all insertion sites, i.e. indwelling lines, tubes, and drains  - Monitor endotracheal if appropriate and nasal secretions for changes in amount and color  - [place] appropriate cooling/warming therapies per order  - Administer medications as ordered  - Instruct and encourage patient and family to use good hand hygiene technique  - Identify and instruct in appropriate isolation precautions for identified infection/condition  Outcome: Adequate for Discharge  Goal: Absence of fever/infection during neutropenic period  Description: INTERVENTIONS:  - Monitor WBC  - Perform strict hand hygiene  - Limit to healthy visitors only  - No plants, dried, fresh or silk flowers with medina in patient room  Outcome: Adequate for Discharge     Problem: SAFETY ADULT  Goal: Patient will remain free of falls  Description: INTERVENTIONS:  - Educate patient/family on patient safety including physical limitations  - Instruct patient to call  for assistance with activity   - Consider consulting OT/PT to assist with strengthening/mobility based on AM PAC & JH-HLM score  - Consult OT/PT to assist with strengthening/mobility   - Keep Call bell within reach  - Keep bed low and locked with side rails adjusted as appropriate  - Keep care items and personal belongings within reach  - Initiate and maintain comfort rounds  - Make Fall Risk Sign visible to staff    - Apply yellow socks and bracelet for high fall risk patients  - Consider moving patient to room near nurses station  Outcome: Adequate for Discharge  Goal: Maintain or return to baseline ADL function  Description: INTERVENTIONS:  -  Assess patient's ability to carry out ADLs; assess patient's baseline for ADL function and identify physical deficits which impact ability to perform ADLs (bathing, care of mouth/teeth, toileting, grooming, dressing, etc.)  - Assess/evaluate cause of self-care deficits   - Assess range of motion  - Assess patient's mobility; develop plan if impaired  - Assess patient's need for assistive devices and provide as appropriate  - Encourage maximum independence but intervene and supervise when necessary  - Involve family in performance of ADLs  - Assess for home care needs following discharge   - Consider OT consult to assist with ADL evaluation and planning for discharge  - Provide patient education as appropriate  - Monitor functional capacity and physical performance, use of AM PAC & JH-HLM   - Monitor gait, balance and fatigue with ambulation    Outcome: Adequate for Discharge  Goal: Maintains/Returns to pre admission functional level  Description: INTERVENTIONS:  - Perform AM-PAC 6 Click Basic Mobility/ Daily Activity assessment daily.  - Set and communicate daily mobility goal to care team and patient/family/caregiver.   - Collaborate with rehabilitation services on mobility goals if consulted    - Out of bed for toileting  - Record patient progress and toleration of  activity level   Outcome: Adequate for Discharge     Problem: DISCHARGE PLANNING  Goal: Discharge to home or other facility with appropriate resources  Description: INTERVENTIONS:  - Identify barriers to discharge w/patient and caregiver  - Arrange for needed discharge resources and transportation as appropriate  - Identify discharge learning needs (meds, wound care, etc.)  - Arrange for interpretive services to assist at discharge as needed  - Refer to Case Management Department for coordinating discharge planning if the patient needs post-hospital services based on physician/advanced practitioner order or complex needs related to functional status, cognitive ability, or social support system  Outcome: Adequate for Discharge     Problem: Knowledge Deficit  Goal: Patient/family/caregiver demonstrates understanding of disease process, treatment plan, medications, and discharge instructions  Description: Complete learning assessment and assess knowledge base.  Interventions:  - Provide teaching at level of understanding  - Provide teaching via preferred learning methods  Outcome: Adequate for Discharge     Problem: Nutrition/Hydration-ADULT  Goal: Nutrient/Hydration intake appropriate for improving, restoring or maintaining nutritional needs  Description: Monitor and assess patient's nutrition/hydration status for malnutrition. Collaborate with interdisciplinary team and initiate plan and interventions as ordered.  Monitor patient's weight and dietary intake as ordered or per policy. Utilize nutrition screening tool and intervene as necessary. Determine patient's food preferences and provide high-protein, high-caloric foods as appropriate.     INTERVENTIONS:  - Monitor oral intake, urinary output, labs, and treatment plans  - Assess nutrition and hydration status and recommend course of action  - Evaluate amount of meals eaten  - Assist patient with eating if necessary   - Allow adequate time for meals  - Recommend/  encourage appropriate diets, oral nutritional supplements, and vitamin/mineral supplements  - Order, calculate, and assess calorie counts as needed  - Recommend, monitor, and adjust tube feedings and TPN/PPN based on assessed needs  - Assess need for intravenous fluids  - Provide specific nutrition/hydration education as appropriate  - Include patient/family/caregiver in decisions related to nutrition  Outcome: Adequate for Discharge     Problem: CARDIOVASCULAR - ADULT  Goal: Maintains optimal cardiac output and hemodynamic stability  Description: INTERVENTIONS:  - Monitor I/O, vital signs and rhythm  - Monitor for S/S and trends of decreased cardiac output  - Administer and titrate ordered vasoactive medications to optimize hemodynamic stability  - Assess quality of pulses, skin color and temperature  - Assess for signs of decreased coronary artery perfusion  - Instruct patient to report change in severity of symptoms  7/2/2025 1441 by Jackie Brownlee RN  Outcome: Adequate for Discharge  7/2/2025 0859 by Jackie Brownlee RN  Outcome: Progressing  Goal: Absence of cardiac dysrhythmias or at baseline rhythm  Description: INTERVENTIONS:  - Continuous cardiac monitoring, vital signs, obtain 12 lead EKG if ordered  - Administer antiarrhythmic and heart rate control medications as ordered  - Monitor electrolytes and administer replacement therapy as ordered  Outcome: Adequate for Discharge

## 2025-07-02 NOTE — ASSESSMENT & PLAN NOTE
CTA chest reports 5 x 4.5 x 4 cm left atrial mass arising from the interatrial septum.  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  Patient is completely asymptomatic. His wishes at this time are non-surgical management.  He can follows up with his primary [facility] team outpatient for ongoing discussions and surveillance. There is no indication at this time for immediate intervention.

## 2025-07-02 NOTE — CONSULTS
Consult Cardiology    Ridge Wick 1940, 84 y.o. male MRN: [id number]    Unit/Bed#: -01 Encounter: [id number]    Attending Provider: Dinah Jasso MD   Primary Care Provider: Omari Williamson DO   Date admitted to hospital: 7/1/2025       Inpatient consult to Cardiology  Consult performed by: RIMA Clarke  Consult ordered by: RIMA Loving          * Atrial mass  Assessment & Plan  CTA chest reports 5 x 4.5 x 4 cm left atrial mass arising from the interatrial septum.  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  Patient is completely asymptomatic. His wishes at this time are non-surgical management.  He can follows up with his primary [facility] team outpatient for ongoing discussions and surveillance. There is no indication at this time for immediate intervention.    CAD (coronary artery disease)  Assessment & Plan  History of anterior MI s/p BMS to LAD in 1999  He has done very well in the past 26 years.  Remains without anginal complaints. HS trop neg x 2.  Echo today shows preserved EF with no significant wall motion abnormality.   Continue aspirin, statin, and beta blocker.    Essential hypertension  Assessment & Plan  BP acceptable this admission. Continue home atenolol 100 mg BID and lisinopril 20 mg daily.    Bilateral lower extremity edema  Assessment & Plan  Chronic edema worsened in the past 2 days following travel from [place].  Improved with IV furosemide x 1 dose.  BNP elevated (likely secondary to LA mass). No vascular congestion on chest imaging.  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  D-dimer negative. No PE on CTA chest  Venous duplex of the LE pending. I am highly suspicious edema is secondary to venous insufficiency.  May benefit from PRN furosemide 20 mg PO  outpatient.              Physician Requesting Consult: Dinah Jasso MD    Reason for Consult / Principal Problem: Left Atrial Mass      HPI: Ridge Wick is a 84 y.o. 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 Quincy Butt with [facility].      Patient presented [facility] ER on the evening of 7/1/2025 with complaint of progressive leg swelling. He just returned from a trip to [place] with his daughter and noted sudden progressive bilateral leg edema. He was concerned for possible DVT, prompting him to come to the hospital. At ER arrival ECG showed SR with 1st degree AV block and nonspecific ST/T wave abnormality. Chest xray was clear. CTA chest was negative for pulmonary embolism with clear lungs. A 5x 4.5 x 4 cm left atrial mass was noted arising from the interatrial septum. A venous duplex was ordered and pending. CBC and CMP were unremarkable. . HS trop 7-> 8. D-dimer < 0.27.    At the time of my evaluation he is resting comfortably in bed breathing well on room air. He adamantly denies any chest discomfort or shortness of breath. He reports significant urination with a dose of IV Lasix yesterday with near resolution of his leg edema. He reports chronic edema related to prior surgeries to both legs but states yesterday he was unable to wear a pair of dress shoes and socks which concerned him and made him come to the ER. He does not take diuretics routinely at home. An echocardiogram has been ordered and pending at the time of my evaluation.      Review of Systems   Constitutional:  Negative for chills and fever.   HENT:  Negative for ear pain and sore throat.    Eyes:  Negative for pain and visual disturbance.   Respiratory:  Negative for cough and shortness of breath.    Cardiovascular:  Positive for leg swelling. Negative for chest pain and  "palpitations.   Gastrointestinal:  Negative for abdominal pain and vomiting.   Genitourinary:  Negative for dysuria and hematuria.   Musculoskeletal:  Negative for arthralgias and back pain.   Skin:  Negative for color change and rash.   Neurological:  Negative for seizures and syncope.   All other systems reviewed and are negative.       Historical Information     Past Medical History[1]  Past Surgical History[2]  Social History     Substance and Sexual Activity   Alcohol Use Not Currently     Social History     Substance and Sexual Activity   Drug Use Never     Tobacco Use History[3]    Family History: Family history non-contributory    Meds/Allergies     current meds:   Current Facility-Administered Medications:     acetaminophen (TYLENOL) tablet 650 mg, Q6H PRN    aspirin chewable tablet 81 mg, Daily    atenolol (TENORMIN) tablet 100 mg, BID    docusate sodium (COLACE) capsule 100 mg, Daily    enoxaparin (LOVENOX) subcutaneous injection 40 mg, Daily    lisinopril (ZESTRIL) tablet 20 mg, Daily    pravastatin (PRAVACHOL) tablet 80 mg, Daily With Dinner       Allergies[4]    Objective     Vitals: Blood pressure 141/84, pulse 68, temperature 98.4 °F (36.9 °C), temperature source Temporal, resp. rate 20, height 5' 8\" (1.727 m), weight 78 kg (172 lb), SpO2 94%., Body mass index is 26.15 kg/m².    Orthostatic Blood Pressures      Flowsheet Row Most Recent Value   Blood Pressure 141/84 filed at 2025 0903   Patient Position - Orthostatic VS Lying filed at 2025 0703            Systolic (24hrs), Av , Min:138 , Max:173     Diastolic (24hrs), Av, Min:65, Max:93        Intake/Output Summary (Last 24 hours) at 2025 1007  Last data filed at 2025 0930  Gross per 24 hour   Intake 360 ml   Output 2950 ml   Net -2590 ml       Weight (last 2 days)       Date/Time Weight    25 0833 78 (172)    25 0748 78.2 (172.4)    25 0720 78.2 (172.4)    25 79.2 (174.6)    25 1711 " 79.4 (175)            Invasive Devices       Peripheral Intravenous Line  Duration             Peripheral IV 25 Right Antecubital <1 day                    Physical Exam  Vitals and nursing note reviewed.   Constitutional:       General: He is not in acute distress.     Appearance: He is well-developed.   HENT:      Head: Normocephalic and atraumatic.     Eyes:      Conjunctiva/sclera: Conjunctivae normal.     Neck:      Vascular: No JVD.     Cardiovascular:      Rate and Rhythm: Normal rate and regular rhythm.      Heart sounds: No murmur heard.  Pulmonary:      Effort: Pulmonary effort is normal. No respiratory distress.      Breath sounds: Normal breath sounds.      Comments: Breathing comfortably on room air  Abdominal:      Palpations: Abdomen is soft.      Tenderness: There is no abdominal tenderness.     Musculoskeletal:         General: No swelling.      Cervical back: Neck supple.      Right lower le+ Edema present.      Left lower le+ Edema present.     Skin:     General: Skin is warm and dry.      Capillary Refill: Capillary refill takes less than 2 seconds.     Neurological:      Mental Status: He is alert.     Psychiatric:         Mood and Affect: Mood normal.              Laboratory Results:          CBC with diff:   Results from last 7 days   Lab Units 25  1725   WBC Thousand/uL 7.16   HEMOGLOBIN g/dL 12.8   HEMATOCRIT % 41.8   MCV fL 96   PLATELETS Thousands/uL 253   RBC Million/uL 4.35   MCH pg 29.4   MCHC g/dL 30.6*   RDW % 13.5   MPV fL 10.5   NRBC AUTO /100 WBCs 0         CMP:  Results from last 7 days   Lab Units 25  1725   POTASSIUM mmol/L 4.7   CHLORIDE mmol/L 99   CO2 mmol/L 31   BUN mg/dL 23   CREATININE mg/dL 0.89   CALCIUM mg/dL 9.3   AST U/L 17   ALT U/L 24   ALK PHOS U/L 77   EGFR ml/min/1.73sq m 77       BMP:  Results from last 7 days   Lab Units 25  1725   POTASSIUM mmol/L 4.7   CHLORIDE mmol/L 99   CO2 mmol/L 31   BUN mg/dL 23   CREATININE mg/dL 0.89    CALCIUM mg/dL 9.3       BNP:    Recent Labs     07/01/25  1725   *     HS trop: 8->7    Coags:   Results from last 7 days   Lab Units 07/01/25  1725   PTT seconds 52*   INR  1.14       Cardiac testing:     Reviewed stress test, Holter at [facility] in 2019.  Echo today      Imaging: Results Review Statement: I reviewed radiology reports from this admission including: chest xray, CT chest, and Echocardiogram.    CT pe study w abdomen pelvis w contrast  Result Date: 7/1/2025  Narrative: CT PULMONARY ANGIOGRAM OF THE CHEST AND CT ABDOMEN AND PELVIS WITH INTRAVENOUS CONTRAST INDICATION: Leg swelling. COMPARISON: None. TECHNIQUE: CT examination of the chest, abdomen and pelvis was performed. Thin section CT angiographic technique was used in the chest in order to evaluate for pulmonary embolus and coronal 3D MIP postprocessing was performed on the acquisition scanner. Multiplanar 2D reformatted images were created from the source data. This examination, like all CT scans performed in the [facility], was performed utilizing techniques to minimize radiation dose exposure, including the use of iterative reconstruction and automated exposure control. Radiation dose length product (DLP) for this visit: 1003 mGy-cm. IV Contrast: 75 mL of iohexol (OMNIPAQUE) Enteric Contrast: Not administered. FINDINGS: CHEST PULMONARY ARTERIAL TREE: No filling defect visualized pulmonary arteries to indicate an acute embolus. LUNGS: Lungs are clear. No tracheal or endobronchial lesion. PLEURA: No effusion. HEART/AORTA: Cardiomegaly. Nonenhancing low-attenuation lesion in the left atrium measuring 5 x 4.5 x 4 cm, likely arising from the interatrial septum. MEDIASTINUM AND BHARGAVI: No lymphadenopathy. CHEST WALL AND LOWER NECK: Unremarkable. ABDOMEN LIVER/BILIARY TREE: Unremarkable. GALLBLADDER: No calcified gallstones. No pericholecystic inflammatory change. SPLEEN: Unremarkable. PANCREAS: Cystic 1.9 cm lesion in the tail.  No ductal dilatation. ADRENAL GLANDS: Unremarkable. KIDNEYS/URETERS: Symmetric nephrographic phase enhancement of the kidneys. Subcentimeter hypoattenuating lesions in the renal cortices, incompletely characterized, though likely to represent cysts. STOMACH AND BOWEL: Diverticulosis without evidence of diverticulitis or colitis. No bowel obstruction. APPENDIX: No findings to suggest appendicitis. ABDOMINOPELVIC CAVITY: No ascites. No pneumoperitoneum. No lymphadenopathy. VESSELS: No abdominal aortic aneurysm. PELVIS REPRODUCTIVE ORGANS: Mild prostate enlargement. URINARY BLADDER: Unremarkable. ABDOMINAL WALL/INGUINAL REGIONS: Unremarkable. BONES: No acute fracture, lytic or blastic lesion.     Impression: 1. 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 Recommend cardiology consultation and [facility]. 2. No evidence of acute pulmonary embolus or thoracic aortic aneurysm. 3. No acute pulmonary process. 4. No acute intra-abdominal or pelvic process. Computerized Assisted Algorithm (CAA) may have aided analysis of applicable images. Workstation performed: [id number]       EKG reviewed personally: EKG: Sinus rhythm with 1st degree AV block, nonspecific ST/T wave abnormality.       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

## 2025-07-02 NOTE — H&P
H&P - Hospitalist   Name: Ridge Wick 84 y.o. 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   Will keep n.p.o. pending cardiology evaluation  Appreciate cardiology consult  Bilateral lower extremity edema  Presented from home with bilateral lower extremity edema L>R after a [place] vacation.  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  No JVD, no vascular congestion on chest x-ray  Received 1 dose of IV diuretics in ED, will hold further diuretics pending cardiology consultation  Essential hypertension  Continue PTA atenolol 100 mg twice daily and lisinopril 20 mg daily  Trend blood pressures  CAD (coronary artery disease)  History CAD and PTCA with stent  Continue PTA aspirin, statin  Denies chest pain and dyspnea      VTE Pharmacologic Prophylaxis:   High Risk (Score >/= 5) - Pharmacological DVT Prophylaxis Ordered: enoxaparin (Lovenox). Sequential Compression Devices Ordered.  Code Status: Level 3 - DNAR and DNI   Discussion with family: Patient declined call to .     Anticipated Length of Stay: Patient will be admitted on an observation basis with an anticipated length of stay of less than 2 midnights secondary to atrial mass.    History of Present Illness   Chief Complaint: Lower extremity edema    Ridge Wick is a 84 y.o. male with a PMH of CAD s/p PTCA with stent, HTN, HLD, sarcoma left thigh s/p surgical resection, who presents with  bilateral lower extremity edema after returning from a [place] vacation.  Did not notice any physical changes while on vacation, denies chest pain and dyspnea.  Upon returning home and putting on dress shoes noticed that his lower extremities were edematous.  He presented to the ED for further evaluation and concern of DVT.  Patient underwent CTA of the chest revealed atrial mass with recommendation for cardiology consultation and WILVER for further evaluation.    Review of Systems   Constitutional:  Negative for chills and fever.   HENT:  Negative for ear pain and sore throat.    Eyes:  Negative for pain and visual disturbance.   Respiratory:  Negative for cough and shortness of breath.    Cardiovascular:  Positive for leg swelling. Negative for chest pain and palpitations.   Gastrointestinal:  Negative for abdominal pain and vomiting.   Genitourinary:  Negative for dysuria and hematuria.   Musculoskeletal:  Negative for arthralgias and back pain.   Skin:  Negative for color change and rash.   Neurological:  Negative for seizures and syncope.   All other systems reviewed and are negative.      Historical Information   Past Medical History[1]  Past Surgical History[2]  Social History[3]  E-Cigarette/Vaping    E-Cigarette Use Never User      E-Cigarette/Vaping Substances     Family History[4]  Social History:  Marital Status: /Civil Union -   Occupation: retired internal medicine physician  Patient Pre-hospital Living Situation: Home  Patient Pre-hospital Level of Mobility: walks  Patient Pre-hospital Diet Restrictions: none    Meds/Allergies   I have reviewed home medications with patient personally.  Prior to Admission medications    Medication Sig Start Date End Date Taking? Authorizing Provider   aspirin 81 mg chewable tablet Chew 81 mg daily   Yes Historical Provider, MD   atenolol (TENORMIN) 50 mg tablet Take 50 mg by mouth in the morning and 50 mg before bedtime.   Yes Historical Provider, MD    docusate sodium (COLACE) 50 mg capsule Take 250 mg by mouth 2 (two) times a day   Yes Historical Provider, MD   lisinopril (ZESTRIL) 20 mg tablet Take 20 mg by mouth daily   Yes Historical Provider, MD   Multiple Vitamin (multivitamin) tablet Take 1 tablet by mouth daily   Yes Historical Provider, MD   simvastatin (ZOCOR) 40 mg tablet Take 40 mg by mouth daily at bedtime   Yes Historical Provider, MD     No Known Allergies    Objective :  Temp:  [97.6 °F (36.4 °C)-97.9 °F (36.6 °C)] 97.7 °F (36.5 °C)  HR:  [53-67] 60  BP: (138-168)/(65-93) 148/75  Resp:  [16-20] 20  SpO2:  [80 %-100 %] 100 %  O2 Device: Nasal cannula  Nasal Cannula O2 Flow Rate (L/min):  [2 L/min] 2 L/min    Physical Exam  Vitals and nursing note reviewed.   Constitutional:       General: He is not in acute distress.     Appearance: He is well-developed.   HENT:      Head: Normocephalic and atraumatic.      Mouth/Throat:      Mouth: Mucous membranes are moist.     Eyes:      Conjunctiva/sclera: Conjunctivae normal.       Cardiovascular:      Rate and Rhythm: Normal rate and regular rhythm.      Heart sounds: No murmur heard.  Pulmonary:      Effort: Pulmonary effort is normal. No respiratory distress.      Breath sounds: Normal breath sounds.   Abdominal:      Palpations: Abdomen is soft.      Tenderness: There is no abdominal tenderness.      Hernia: A hernia is present.      Comments: Umbilical hernia, soft and reducible     Musculoskeletal:         General: Swelling present.      Cervical back: Neck supple.      Right lower leg: Edema present.      Left lower leg: Edema present.     Skin:     General: Skin is warm and dry.      Capillary Refill: Capillary refill takes less than 2 seconds.     Neurological:      General: No focal deficit present.      Mental Status: He is alert and oriented to person, place, and time.     Psychiatric:         Mood and Affect: Mood normal.         Behavior: Behavior normal.         Lab Results: I have reviewed  "the following results:  Results from last 7 days   Lab Units 07/01/25  1725   WBC Thousand/uL 7.16   HEMOGLOBIN g/dL 12.8   HEMATOCRIT % 41.8   PLATELETS Thousands/uL 253   SEGS PCT % 68   LYMPHO PCT % 17   MONO PCT % 13*   EOS PCT % 1     Results from last 7 days   Lab Units 07/01/25  1725   SODIUM mmol/L 136   POTASSIUM mmol/L 4.7   CHLORIDE mmol/L 99   CO2 mmol/L 31   BUN mg/dL 23   CREATININE mg/dL 0.89   ANION GAP mmol/L 6   CALCIUM mg/dL 9.3   ALBUMIN g/dL 4.1   TOTAL BILIRUBIN mg/dL 0.69   ALK PHOS U/L 77   ALT U/L 24   AST U/L 17   GLUCOSE RANDOM mg/dL 102     Results from last 7 days   Lab Units 07/01/25  1725   INR  1.14         No results found for: \"HGBA1C\"        Imaging Results Review: I reviewed radiology reports from this admission including: CT chest and CT abdomen/pelvis.  Other Study Results Review: EKG was reviewed.       ** Please Note: This note has been constructed using a voice recognition system. **         [1] No past medical history on file.  [2] No past surgical history on file.  [3]   Social History  Tobacco Use    Smoking status: Never    Smokeless tobacco: Never   Vaping Use    Vaping status: Never Used   Substance and Sexual Activity    Alcohol use: Not Currently    Drug use: Never   [4] No family history on file.    "

## 2025-07-02 NOTE — DISCHARGE INSTR - AVS FIRST PAGE
Dear Ridge Wick,     It was our pleasure to care for you here at [facility].  It is our hope that we were always able to exceed the expected standards for your care during your stay.  You were hospitalized due to bilateral lower extremity swelling.  You were cared for on the 2nd floor under the service of Dinah Jasso MD with the [facility] who covers for your primary care physician (PCP), Omari Williamson DO, while you were hospitalized.  If you have any questions or concerns related to this hospitalization, you may contact us at .  For follow up as well as medication refills, we recommend that you follow up with your primary care physician.  A registered nurse will reach out to you by phone within a few days after your discharge to answer any additional questions that you may have after going home.  However, at this time we provide for you here, the most important instructions / recommendations at discharge:     Notable Medication Adjustments -   Lasix 20 mg prn for LE edema  Testing Required after Discharge -   None  ** Please contact your PCP to request testing orders for any of the testing recommended here **  Important follow up information -   Please contact your cardiologist at [facility] for a follow up appointment  Please review this entire after visit summary as additional general instructions including medication list, appointments, activity, diet, any pertinent wound care, and other additional recommendations from your care team that may be provided for you.      Sincerely,     Dinah Jasso MD

## 2025-07-02 NOTE — DISCHARGE SUMMARY
Discharge Summary - Hospitalist   Name: Ridge Wick 84 y.o. 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  No JVD, no vascular congestion on chest x-ray  Received 1 dose of IV diuretics in ED with notable improvement in bilateral lower extremity edema  BNP elevated likely secondary to LE mass.  No vascular congestion on chest imaging  TTE with preserved LVEF with KD mass consistent with myxoma, mild TR  Will DC patient on as needed furosemide 20 mg p.o. outpatient  Essential hypertension  Continue PTA atenolol 100 mg twice daily and lisinopril 20 mg daily  CAD (coronary artery disease)  History CAD and PTCA with stent  Continue PTA aspirin, statin  Denies chest pain and dyspnea     Medical Problems       Resolved Problems  Date  Reviewed: 7/2/2025   None       Discharging Physician / Practitioner: Dinah Jasso MD  PCP: Omari Williamson DO  Admission Date:   Admission Orders (From admission, onward)       Ordered        07/01/25 2001  Place in Observation  Once                          Discharge Date: 07/02/25    Next Steps for Physician/AP Assuming Care:  Please ensure patient has appropriate follow-up with his cardiologist at [facility]    Test Results Pending at Discharge (will require follow up):  None    Medication Changes for Discharge & Rationale:     See after visit summary for reconciled discharge medications provided to patient and/or family.     Consultations During Hospital Stay:  Cardiology    Procedures Performed:   None    Significant Findings / Test Results:   CT pe study w abdomen pelvis w contrast   Final Result by Rene Dickens MD (07/01 1939)         1. 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 Recommend cardiology consultation and WILVER.   2. No evidence of acute pulmonary embolus or thoracic aortic aneurysm.   3. No acute pulmonary process.   4. No acute intra-abdominal or pelvic process.                  Computerized Assisted Algorithm (CAA) may have aided analysis of applicable images.      Workstation performed: [id number]         X-ray chest 1 view portable   Final Result by Chase Kerr MD (07/02 1411)      No acute cardiopulmonary process. Cardiomegaly.            Resident: [place] I, the attending radiologist, have reviewed the images and agree with the final report above.      Workstation performed: [id number]          VAS VENOUS DUPLEX - LOWER LIMB BILATERAL    (Results Pending)         Incidental Findings:   As above      Hospital Course:   Ridge Wick is a 84 y.o. male patient who originally presented to the hospital on 7/1/2025 due to bilateral lower extremity edema after returning from [place] from a vacation.  Patient  "denied any physical changes while on vacation, denied any chest pain or dyspnea.  Upon returning home and putting on her shoes he noticed his lower extremities were edematous.  He reports chronic left lower extremity edema greater than right however this was significantly worse than before.  Patient reports that he was concerned for DVT therefore presented to the ED for further evaluation.  In the ED patient underwent CT of the chest which revealed atrial mass therefore patient was admitted to the hospitalist service for further evaluation and cardiac consultation.  Patient underwent TTE which showed atrial myxoma, preserved left ventricular ejection fraction with no concern for heart failure.  Patient sees cardiology at [facility] and would like to follow-up with them outpatient.  While hospitalized cardiology did not recommend any inpatient surgical intervention.  Patient's lower extremity edema did improve with 1 dose of IV furosemide therefore he is being discharged home on p.o. furosemide 20 mg as needed for lower extremity swelling.  Patient improved throughout hospitalization.  He was medically stable for discharge home.      The patient, initially admitted to the hospital as inpatient, was discharged earlier than expected given the following: Negative workup with improvement in symptoms.  Please see above list of diagnoses and related plan for additional information.     Discharge Day Visit / Exam:   Subjective: Patient seen and examined at bedside.  No acute events overnight.  Patient reports improvement in symptoms.  He is eager to be discharged home.  Vitals: Blood Pressure: 141/84 (07/02/25 0903)  Pulse: 68 (07/02/25 0903)  Temperature: 98.4 °F (36.9 °C) (07/02/25 0703)  Temp Source: Temporal (07/02/25 0703)  Respirations: 20 (07/02/25 0703)  Height: 5' 8\" (172.7 cm) (07/02/25 0833)  Weight - Scale: 78 kg (172 lb) (07/02/25 0833)  SpO2: 94 % (07/02/25 0908)  Physical Exam  Vitals and nursing note " reviewed.   Constitutional:       General: He is not in acute distress.     Appearance: He is not toxic-appearing.   HENT:      Head: Normocephalic and atraumatic.     Eyes:      Extraocular Movements: Extraocular movements intact.      Pupils: Pupils are equal, round, and reactive to light.       Cardiovascular:      Rate and Rhythm: Normal rate and regular rhythm.   Pulmonary:      Effort: Pulmonary effort is normal. No respiratory distress.   Abdominal:      Palpations: Abdomen is soft.      Tenderness: There is no abdominal tenderness.     Musculoskeletal:         General: Swelling present.      Right lower leg: Edema present.      Left lower leg: Edema present.     Skin:     General: Skin is warm.     Neurological:      General: No focal deficit present.      Mental Status: He is alert and oriented to person, place, and time. Mental status is at baseline.     Psychiatric:         Mood and Affect: Mood normal.         Behavior: Behavior normal.          Discussion with Family: Updated  (son and daughter) at bedside.    Discharge instructions/Information to patient and family:   See after visit summary for information provided to patient and family.      Provisions for Follow-Up Care:  See after visit summary for information related to follow-up care and any pertinent home health orders.      Mobility at time of Discharge:   Basic Mobility Inpatient Raw Score: 20  JH-HLM Goal: 6: Walk 10 steps or more  JH-HLM Achieved: 7: Walk 25 feet or more  HLM Goal achieved. Continue to encourage appropriate mobility.     Disposition:   Home    Planned Readmission: No    Administrative Statements       **Please Note: This note may have been constructed using a voice recognition system**

## 2025-07-02 NOTE — ASSESSMENT & PLAN NOTE
Presented from home with bilateral lower extremity edema L>R after a [place] vacation.  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  No JVD, no vascular congestion on chest x-ray  Received 1 dose of IV diuretics in ED, will hold further diuretics pending cardiology consultation

## 2025-07-02 NOTE — NURSING NOTE
Patient discharged home today; IV removed intact, no bleeding noted; AVS printed and reviewed with patient, son and daughter present; pt verbalized understanding; patient ambulates to exit accompanied by PCA and son and daughter.